# Patient Record
Sex: FEMALE
[De-identification: names, ages, dates, MRNs, and addresses within clinical notes are randomized per-mention and may not be internally consistent; named-entity substitution may affect disease eponyms.]

---

## 2019-12-05 ENCOUNTER — HOSPITAL ENCOUNTER (OUTPATIENT)
Dept: HOSPITAL 5 - SPVWC | Age: 59
Discharge: HOME | End: 2019-12-05
Attending: SURGERY
Payer: COMMERCIAL

## 2019-12-05 DIAGNOSIS — R92.2: Primary | ICD-10-CM

## 2019-12-05 NOTE — ULTRASOUND REPORT
LEFT BREAST ULTRASOUND 



HISTORY: Mammographic asymmetry.



COMPARISON: 11/26/2019 diagnostic mammogram and 10/25/2019 screening mammogram.



FINDINGS: Sonographic evaluation focused upon the upper outer location of the left breast demonstrate
s no distinct abnormality.   No mass, cyst or suspicious shadowing.



IMPRESSION:

Negative right breast ultrasound. The mammographic density appears to be normal fibroglandular struct
ures.



Recommend routine mammographic screening in one year.



BIRADS 1:  Negative.



Signer Name: Kwabena Root MD 

Signed: 12/5/2019 1:27 PM

 Workstation Name: [id number]